# Patient Record
Sex: FEMALE | Race: BLACK OR AFRICAN AMERICAN | Employment: OTHER | URBAN - METROPOLITAN AREA
[De-identification: names, ages, dates, MRNs, and addresses within clinical notes are randomized per-mention and may not be internally consistent; named-entity substitution may affect disease eponyms.]

---

## 2021-08-14 ENCOUNTER — APPOINTMENT (OUTPATIENT)
Dept: GENERAL RADIOLOGY | Age: 66
End: 2021-08-14
Attending: PHYSICIAN ASSISTANT
Payer: MEDICARE

## 2021-08-14 ENCOUNTER — HOSPITAL ENCOUNTER (EMERGENCY)
Age: 66
Discharge: HOME | End: 2021-08-14
Attending: STUDENT IN AN ORGANIZED HEALTH CARE EDUCATION/TRAINING PROGRAM
Payer: MEDICARE

## 2021-08-14 VITALS
OXYGEN SATURATION: 100 % | TEMPERATURE: 98.9 F | WEIGHT: 134 LBS | HEIGHT: 65 IN | DIASTOLIC BLOOD PRESSURE: 80 MMHG | BODY MASS INDEX: 22.33 KG/M2 | SYSTOLIC BLOOD PRESSURE: 135 MMHG | RESPIRATION RATE: 22 BRPM | HEART RATE: 76 BPM

## 2021-08-14 DIAGNOSIS — H81.10 BENIGN PAROXYSMAL POSITIONAL VERTIGO, UNSPECIFIED LATERALITY: Primary | ICD-10-CM

## 2021-08-14 LAB
ANION GAP SERPL CALC-SCNC: 2 MMOL/L (ref 3–18)
ATRIAL RATE: 65 BPM
BASOPHILS # BLD: 0 K/UL (ref 0–0.1)
BASOPHILS NFR BLD: 0 % (ref 0–2)
BUN SERPL-MCNC: 12 MG/DL (ref 7–18)
BUN/CREAT SERPL: 13 (ref 12–20)
CALCIUM SERPL-MCNC: 8.5 MG/DL (ref 8.5–10.1)
CALCULATED P AXIS, ECG09: 52 DEGREES
CALCULATED R AXIS, ECG10: -7 DEGREES
CALCULATED T AXIS, ECG11: -69 DEGREES
CHLORIDE SERPL-SCNC: 110 MMOL/L (ref 100–111)
CK MB CFR SERPL CALC: 0.9 % (ref 0–4)
CK MB SERPL-MCNC: 1.6 NG/ML (ref 5–25)
CK SERPL-CCNC: 188 U/L (ref 26–192)
CO2 SERPL-SCNC: 27 MMOL/L (ref 21–32)
CREAT SERPL-MCNC: 0.89 MG/DL (ref 0.6–1.3)
DIAGNOSIS, 93000: NORMAL
DIFFERENTIAL METHOD BLD: ABNORMAL
EOSINOPHIL # BLD: 0.2 K/UL (ref 0–0.4)
EOSINOPHIL NFR BLD: 2 % (ref 0–5)
ERYTHROCYTE [DISTWIDTH] IN BLOOD BY AUTOMATED COUNT: 15.5 % (ref 11.6–14.5)
GLUCOSE SERPL-MCNC: 165 MG/DL (ref 74–99)
HCT VFR BLD AUTO: 34.3 % (ref 35–45)
HGB BLD-MCNC: 11.4 G/DL (ref 12–16)
LYMPHOCYTES # BLD: 1.4 K/UL (ref 0.9–3.6)
LYMPHOCYTES NFR BLD: 20 % (ref 21–52)
MAGNESIUM SERPL-MCNC: 1.9 MG/DL (ref 1.6–2.6)
MCH RBC QN AUTO: 29.2 PG (ref 24–34)
MCHC RBC AUTO-ENTMCNC: 33.2 G/DL (ref 31–37)
MCV RBC AUTO: 87.7 FL (ref 74–97)
MONOCYTES # BLD: 0.4 K/UL (ref 0.05–1.2)
MONOCYTES NFR BLD: 5 % (ref 3–10)
NEUTS SEG # BLD: 5.1 K/UL (ref 1.8–8)
NEUTS SEG NFR BLD: 73 % (ref 40–73)
P-R INTERVAL, ECG05: 164 MS
PLATELET # BLD AUTO: 191 K/UL (ref 135–420)
PMV BLD AUTO: 9.5 FL (ref 9.2–11.8)
POTASSIUM SERPL-SCNC: 3.8 MMOL/L (ref 3.5–5.5)
Q-T INTERVAL, ECG07: 418 MS
QRS DURATION, ECG06: 80 MS
QTC CALCULATION (BEZET), ECG08: 434 MS
RBC # BLD AUTO: 3.91 M/UL (ref 4.2–5.3)
SODIUM SERPL-SCNC: 139 MMOL/L (ref 136–145)
TROPONIN I SERPL-MCNC: <0.02 NG/ML (ref 0–0.04)
TROPONIN I SERPL-MCNC: <0.02 NG/ML (ref 0–0.04)
VENTRICULAR RATE, ECG03: 65 BPM
WBC # BLD AUTO: 7 K/UL (ref 4.6–13.2)

## 2021-08-14 PROCEDURE — 82550 ASSAY OF CK (CPK): CPT

## 2021-08-14 PROCEDURE — 99285 EMERGENCY DEPT VISIT HI MDM: CPT

## 2021-08-14 PROCEDURE — 84484 ASSAY OF TROPONIN QUANT: CPT

## 2021-08-14 PROCEDURE — 83735 ASSAY OF MAGNESIUM: CPT

## 2021-08-14 PROCEDURE — 85025 COMPLETE CBC W/AUTO DIFF WBC: CPT

## 2021-08-14 PROCEDURE — 93005 ELECTROCARDIOGRAM TRACING: CPT

## 2021-08-14 PROCEDURE — 80048 BASIC METABOLIC PNL TOTAL CA: CPT

## 2021-08-14 PROCEDURE — 71045 X-RAY EXAM CHEST 1 VIEW: CPT

## 2021-08-14 PROCEDURE — 74011250636 HC RX REV CODE- 250/636: Performed by: STUDENT IN AN ORGANIZED HEALTH CARE EDUCATION/TRAINING PROGRAM

## 2021-08-14 RX ORDER — MECLIZINE HYDROCHLORIDE 25 MG/1
25 TABLET ORAL
Qty: 12 TABLET | Refills: 0 | Status: SHIPPED | OUTPATIENT
Start: 2021-08-14 | End: 2021-08-24

## 2021-08-14 RX ORDER — ONDANSETRON 4 MG/1
4 TABLET, ORALLY DISINTEGRATING ORAL
Qty: 20 TABLET | Refills: 0 | Status: SHIPPED | OUTPATIENT
Start: 2021-08-14

## 2021-08-14 RX ORDER — MECLIZINE HCL 12.5 MG 12.5 MG/1
25 TABLET ORAL ONCE
Status: COMPLETED | OUTPATIENT
Start: 2021-08-14 | End: 2021-08-14

## 2021-08-14 RX ADMIN — MECLIZINE 25 MG: 12.5 TABLET ORAL at 14:35

## 2021-08-14 NOTE — ED TRIAGE NOTES
Pt arrived via [employer]. Per medic, pt woke up this with dizziness, nausea, and chest tightness. Pt was given 324mg ASA and Zofran via Medic.

## 2021-08-14 NOTE — ED NOTES
Reviewed discharge instructions with pt. Pt verbalized understanding of instructions. Pt wheeled to waiting room by staff. Pt in no distress upon discharge.

## 2021-08-14 NOTE — ED PROVIDER NOTES
EMERGENCY DEPARTMENT HISTORY AND PHYSICAL EXAM    I have evaluated the patient at 1:01 PM      Date: 8/14/2021  Patient Name: Annette Jung    History of Presenting Illness     Chief Complaint   Patient presents with    Dizziness    Chest Pain         History Provided By: Patient  Location/Duration/Severity/Modifying factors   This is a 26-year-old female recently moved from [place] with past medical history significant for diabetes, CAD, hypertension, hyperlipidemia, hypothyroidism presenting to the emergency department for evaluation of cute onset vertigo and chest tightness. Patient reports that she awoke around 10 AM this morning with the entire room spinning. This was worsened with her sitting up and trying to ambulate. She reports having acute nausea as well as some chest tightness associated with this. Patient reports that she had similar symptoms last year in [place] when she was diagnosed with a \"heart attack\". She reports being in her usual state of health last night prior to going to bed. No recent illnesses. Denies any weakness in her extremities or numbness or tingling. PCP: Griselda Zavala MD        Past History     Past Medical History:  No past medical history on file. Past Surgical History:  No past surgical history on file. Family History:  No family history on file. Social History:  Social History     Tobacco Use    Smoking status: Not on file   Substance Use Topics    Alcohol use: Not on file    Drug use: Not on file       Allergies: Allergies   Allergen Reactions    Sulfa (Sulfonamide Antibiotics) Anaphylaxis    Penicillins Hives         Review of Systems       Review of Systems   Constitutional: Negative for activity change, chills, diaphoresis, fatigue and fever. HENT: Negative for congestion, ear pain, sinus pain and voice change. Eyes: Negative for photophobia, pain and visual disturbance. Respiratory: Positive for chest tightness. Negative for cough, shortness of breath, wheezing and stridor. Cardiovascular: Negative for chest pain and palpitations. Gastrointestinal: Positive for nausea. Negative for abdominal distention, abdominal pain, constipation, diarrhea and vomiting. Endocrine: Negative for polydipsia, polyphagia and polyuria. Genitourinary: Negative for difficulty urinating, dysuria and hematuria. Musculoskeletal: Negative for back pain, joint swelling and myalgias. Skin: Negative for rash. Neurological: Positive for light-headedness. Negative for dizziness, weakness and headaches. Vertigo   Psychiatric/Behavioral: Negative for agitation. The patient is not nervous/anxious. Physical Exam     Visit Vitals  BP (!) 141/71 (BP 1 Location: Left upper arm, BP Patient Position: At rest)   Pulse 69   Temp 98.9 °F (37.2 °C)   Resp 24   Ht 5' 5\" (1.651 m)   Wt 60.8 kg (134 lb)   SpO2 100%   BMI 22.30 kg/m²         Physical Exam  Constitutional:       General: She is not in acute distress. Appearance: She is not toxic-appearing. HENT:      Head: Normocephalic and atraumatic. Mouth/Throat:      Mouth: Mucous membranes are moist.   Eyes:      Extraocular Movements: Extraocular movements intact. Pupils: Pupils are equal, round, and reactive to light. Cardiovascular:      Rate and Rhythm: Normal rate and regular rhythm. Heart sounds: Normal heart sounds. No murmur heard. No friction rub. No gallop. Pulmonary:      Effort: Pulmonary effort is normal.      Breath sounds: Normal breath sounds. Abdominal:      General: There is no distension. Palpations: Abdomen is soft. There is no mass. Tenderness: There is no abdominal tenderness. There is no guarding. Hernia: No hernia is present. Musculoskeletal:         General: No swelling, tenderness or deformity. Cervical back: Normal range of motion and neck supple. Skin:     General: Skin is warm and dry.       Findings: No rash.   Neurological:      General: No focal deficit present. Mental Status: She is alert and oriented to person, place, and time. Comments: Patient has positive leftward nystagmus, negative test of skew, positive head impulse   Psychiatric:         Mood and Affect: Mood normal.           Diagnostic Study Results     Labs -  Recent Results (from the past 12 hour(s))   EKG, 12 LEAD, INITIAL    Collection Time: 08/14/21 12:04 PM   Result Value Ref Range    Ventricular Rate 65 BPM    Atrial Rate 65 BPM    P-R Interval 164 ms    QRS Duration 80 ms    Q-T Interval 418 ms    QTC Calculation (Bezet) 434 ms    Calculated P Axis 52 degrees    Calculated R Axis -7 degrees    Calculated T Axis -69 degrees    Diagnosis       Normal sinus rhythm  Inferior infarct , age undetermined  ST & T wave abnormality, consider anterolateral ischemia  Abnormal ECG  No previous ECGs available  Confirmed by Lino Cherry ([id number]) on 8/14/2021 3:16:42 PM     CBC WITH AUTOMATED DIFF    Collection Time: 08/14/21 12:09 PM   Result Value Ref Range    WBC 7.0 4.6 - 13.2 K/uL    RBC 3.91 (L) 4.20 - 5.30 M/uL    HGB 11.4 (L) 12.0 - 16.0 g/dL    HCT 34.3 (L) 35.0 - 45.0 %    MCV 87.7 74.0 - 97.0 FL    MCH 29.2 24.0 - 34.0 PG    MCHC 33.2 31.0 - 37.0 g/dL    RDW 15.5 (H) 11.6 - 14.5 %    PLATELET 074 343 - 808 K/uL    MPV 9.5 9.2 - 11.8 FL    NEUTROPHILS 73 40 - 73 %    LYMPHOCYTES 20 (L) 21 - 52 %    MONOCYTES 5 3 - 10 %    EOSINOPHILS 2 0 - 5 %    BASOPHILS 0 0 - 2 %    ABS. NEUTROPHILS 5.1 1.8 - 8.0 K/UL    ABS. LYMPHOCYTES 1.4 0.9 - 3.6 K/UL    ABS. MONOCYTES 0.4 0.05 - 1.2 K/UL    ABS. EOSINOPHILS 0.2 0.0 - 0.4 K/UL    ABS.  BASOPHILS 0.0 0.0 - 0.1 K/UL    DF AUTOMATED     METABOLIC PANEL, BASIC    Collection Time: 08/14/21 12:09 PM   Result Value Ref Range    Sodium 139 136 - 145 mmol/L    Potassium 3.8 3.5 - 5.5 mmol/L    Chloride 110 100 - 111 mmol/L    CO2 27 21 - 32 mmol/L    Anion gap 2 (L) 3.0 - 18 mmol/L    Glucose 165 (H) 74 - 99 mg/dL    BUN 12 7.0 - 18 MG/DL    Creatinine 0.89 0.6 - 1.3 MG/DL    BUN/Creatinine ratio 13 12 - 20      GFR est AA >60 >60 ml/min/1.73m2    GFR est non-AA >60 >60 ml/min/1.73m2    Calcium 8.5 8.5 - 10.1 MG/DL   TROPONIN I    Collection Time: 08/14/21 12:09 PM   Result Value Ref Range    Troponin-I, QT <0.02 0.0 - 0.045 NG/ML   MAGNESIUM    Collection Time: 08/14/21 12:09 PM   Result Value Ref Range    Magnesium 1.9 1.6 - 2.6 mg/dL   CARDIAC PANEL,(CK, CKMB & TROPONIN)    Collection Time: 08/14/21  1:22 PM   Result Value Ref Range    CK - MB 1.6 <3.6 ng/ml    CK-MB Index 0.9 0.0 - 4.0 %     26 - 192 U/L    Troponin-I, QT <0.02 0.0 - 0.045 NG/ML       Radiologic Studies -   XR CHEST PORT   Final Result   1. Unremarkable chest                      Medical Decision Making   I am the first provider for this patient. I reviewed the vital signs, available nursing notes, past medical history, past surgical history, family history and social history. Vital Signs-Reviewed the patient's vital signs. EKG: SR with a rate of 65 bpm.  There are T wave inversions in the precordial leads. Otherwise with normal intervals. Records Reviewed: Nursing Notes (Time of Review: 1:01 PM)    ED Course: Progress Notes, Reevaluation, and Consults:         Provider Notes (Medical Decision Making):   MDM  Number of Diagnoses or Management Options  Benign paroxysmal positional vertigo, unspecified laterality  Diagnosis management comments: Patient's presentation is most consistent with benign peripheral positional vertigo. The symptoms do worsen when I have the patient sit up. Her vital signs are stable. She saturating well on room air. She is afebrile. Her heart sounds are regular and lungs are clear. Will obtain cardiac enzymes to rule out ACS she does have this history. 1435:  Patient's blood work is largely unremarkable. Troponin negative x2.   Chest x-ray is normal.  Patient got up to use the bathroom but did start having vertiginous symptoms. We will give her meclizine here and reevaluate. If she is feeling improved. She can be discharged with prescription for meclizine. 1537:  Patient gets up too fast she does get vertiginous still after meclizine. However, with getting up slowly, this is more manageable. Believe that the patient can be discharged home with prescriptions for meclizine as previously described. I have discussed ED return precautions with her as well as follow-up with her primary care doctor. She states she has an appointment to have a stress test done in the next couple of weeks. Questions have been answered and she verbalizes good understanding and agreement with plan. She stable for discharge. Diagnosis     Clinical Impression:   1. Benign paroxysmal positional vertigo, unspecified laterality        Disposition: home      Follow-up Information     Follow up With Specialties Details Why [address] [facility] EMERGENCY DEPT Emergency Medicine  As needed, If symptoms worsen [address] [place], [place] [id number] [phone]    Naif Fox MD General Practice Schedule an appointment as soon as possible for a visit   [address]  [phone]             Patient's Medications   Start Taking    MECLIZINE (ANTIVERT) 25 MG TABLET    Take 1 Tablet by mouth three (3) times daily as needed for Dizziness for up to 10 days. ONDANSETRON (ZOFRAN ODT) 4 MG DISINTEGRATING TABLET    1 Tablet by SubLINGual route every eight (8) hours as needed for Nausea or Vomiting. Continue Taking    No medications on file   These Medications have changed    No medications on file   Stop Taking    No medications on file     Disclaimer: Sections of this note are dictated using utilizing voice recognition software. Minor typographical errors may be present. If questions arise, please do not hesitate to contact me or call our department.